# Patient Record
Sex: FEMALE | URBAN - METROPOLITAN AREA
[De-identification: names, ages, dates, MRNs, and addresses within clinical notes are randomized per-mention and may not be internally consistent; named-entity substitution may affect disease eponyms.]

---

## 2019-06-29 ENCOUNTER — APPOINTMENT (RX ONLY)
Dept: URBAN - METROPOLITAN AREA CLINIC 15 | Facility: CLINIC | Age: 59
Setting detail: DERMATOLOGY
End: 2019-06-29

## 2019-06-29 DIAGNOSIS — Z41.9 ENCOUNTER FOR PROCEDURE FOR PURPOSES OTHER THAN REMEDYING HEALTH STATE, UNSPECIFIED: ICD-10-CM

## 2019-06-29 PROCEDURE — ? COOLSCULPTING

## 2019-06-29 PROCEDURE — ? MEDICAL CONSULTATION: COOLSCULPTING

## 2019-06-29 ASSESSMENT — LOCATION DETAILED DESCRIPTION DERM: LOCATION DETAILED: PERIUMBILICAL SKIN

## 2019-06-29 ASSESSMENT — LOCATION ZONE DERM: LOCATION ZONE: TRUNK

## 2019-06-29 ASSESSMENT — LOCATION SIMPLE DESCRIPTION DERM: LOCATION SIMPLE: ABDOMEN

## 2019-06-29 NOTE — PROCEDURE: COOLSCULPTING
Patient Weight-Include Units (Optional): 127 lb
Price (Use Numbers Only, No Special Characters Or $): 0
Post-Care Instructions: I reviewed with the patient in detail post-care instructions.
Detail Level: Zone
Indication: non-invasive fat removal
Device: Zeltiq CoolSculpt
Consent: Written consent obtained, risks reviewed including but not limited to blistering from suction, darker or lighter pigmentary change, bruising, and/or need for multiple treatments.
Post Treatment: After treatment, the suction was turned off, and the applicator was removed from the skin. \\nA massage of 2 minutes of duration was performed area immediately after removing the applicators. \\nTreatment was well tolerated without complications. \\n\\nPatient reported 0 pain by the time of leaving. The area had mild erythema; it was numbed and had no bruising, blanching or signs of epidermal damage. \\nHome instructions were given with our phone and email contacts and I suggested drinking a lot of water, avoiding anti-inflammatories for 2 weeks (except Tylenol), Vitamin D3 5,000 UI, Vitamin C 1,000 mg and Zinc 50 mg once a day for 3 months and follow up in 6-8 weeks. \\nAlastin TransFORM Body Treatment was recommended twice a day to accelerate body fat reduction procedure results and to improve the appearance of skin texture, laxity and crepiness. \\n\\nCOOLSCULPTING TREATMENT PRICES\\nMid abdomen: CoolAdvantage core x 2 ($1,200)
Intro: Patient was here for CoolSculpting treatment on the mid abdomen area. \\nPatient signed the consent form, photo release form and had the opportunity to ask questions. Baseline pictures were taken. \\nWeight: 127 lb. \\n\\nPrior to treatment, the area was cleaned with alcohol (pretreatment skin wipes) and marked out with a marking pen. The gel sheet was then applied uniformly. The applicator was applied to the skin with good contact and suction. \\n\\nMid abdomen area was treated with CoolAdvantageâ¢ core x 2 applicators. \\n
Applicators: CoolAdvantage Core
Location: mid abdomen (right)
Time (Minutes): [address]
Location: mid abdomen (left)
Time (Minutes): 35